# Patient Record
Sex: FEMALE | Race: WHITE
[De-identification: names, ages, dates, MRNs, and addresses within clinical notes are randomized per-mention and may not be internally consistent; named-entity substitution may affect disease eponyms.]

---

## 2023-07-24 PROBLEM — Z00.00 ENCOUNTER FOR PREVENTIVE HEALTH EXAMINATION: Status: ACTIVE | Noted: 2023-07-24

## 2023-08-08 ENCOUNTER — APPOINTMENT (OUTPATIENT)
Dept: NEUROLOGY | Facility: CLINIC | Age: 28
End: 2023-08-08
Payer: COMMERCIAL

## 2023-08-08 VITALS
SYSTOLIC BLOOD PRESSURE: 110 MMHG | HEART RATE: 93 BPM | DIASTOLIC BLOOD PRESSURE: 77 MMHG | BODY MASS INDEX: 37.56 KG/M2 | OXYGEN SATURATION: 97 % | HEIGHT: 64 IN | WEIGHT: 220 LBS

## 2023-08-08 DIAGNOSIS — Z78.9 OTHER SPECIFIED HEALTH STATUS: ICD-10-CM

## 2023-08-08 DIAGNOSIS — R20.0 ANESTHESIA OF SKIN: ICD-10-CM

## 2023-08-08 DIAGNOSIS — G89.29 DORSALGIA, UNSPECIFIED: ICD-10-CM

## 2023-08-08 DIAGNOSIS — M54.9 DORSALGIA, UNSPECIFIED: ICD-10-CM

## 2023-08-08 DIAGNOSIS — S06.0X9S CONCUSSION WITH LOSS OF CONSCIOUSNESS OF UNSPECIFIED DURATION, SEQUELA: ICD-10-CM

## 2023-08-08 PROCEDURE — 99204 OFFICE O/P NEW MOD 45 MIN: CPT

## 2023-08-08 RX ORDER — NORETHINDRONE ACETATE AND ETHINYL ESTRADIOL AND FERROUS FUMARATE 1MG-20(21)
1-20 KIT ORAL
Refills: 0 | Status: ACTIVE | COMMUNITY
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
## 2023-08-08 NOTE — ASSESSMENT
[FreeTextEntry1] : 28 year old female presents today with complaints of right thigh numbness for the past 2 months. has chronic LBP  on exam RA thigh with decreased sensation to pp and light touch. improved some. EMG discussed to r/o meralgia , paresthecia declined. for her LBP, PT, LS MRI  avoid putting phone or wallet in side of pockets.  further evaluation based upon above findings

## 2023-08-08 NOTE — PHYSICAL EXAM
[Person] : oriented to person [Place] : oriented to place [Time] : oriented to time [Fluency] : fluency intact [Past History] : adequate knowledge of personal past history [Cranial Nerves Optic (II)] : visual acuity intact bilaterally,  visual fields full to confrontation, pupils equal round and reactive to light [Cranial Nerves Oculomotor (III)] : extraocular motion intact [Cranial Nerves Trigeminal (V)] : facial sensation intact symmetrically [Cranial Nerves Facial (VII)] : face symmetrical [Cranial Nerves Glossopharyngeal (IX)] : tongue and palate midline [Cranial Nerves Vestibulocochlear (VIII)] : hearing was intact bilaterally [Cranial Nerves Accessory (XI - Cranial And Spinal)] : head turning and shoulder shrug symmetric [Cranial Nerves Hypoglossal (XII)] : there was no tongue deviation with protrusion [Motor Tone] : muscle tone was normal in all four extremities [Motor Strength] : muscle strength was normal in all four extremities [No Muscle Atrophy] : normal bulk in all four extremities [Abnormal Walk] : normal gait [Balance] : balance was intact [2+] : Ankle jerk left 2+ [General Appearance - In No Acute Distress] : in no acute distress [Affect] : the affect was normal [Mood] : the mood was normal [Short Term Intact] : short term memory intact [Remote Intact] : remote memory intact [Registration Intact] : recent registration memory intact [Current Events] : adequate knowledge of current events [Motor Handedness Right-Handed] : the patient is right hand dominant [Proprioception] : proprioception was intact [Tactile Decrease Upper Thigh (L2) - Right Only] : diminished right upper thigh (L2) [Tactile Decrease Upper Thigh (L2) - Left Only] : normal left upper thigh (L2) [Tactile Decrease Lower Thigh (L3) - Right Only] : diminished right lower thigh (L3) [Tactile Decrease Lower Thigh (L3) - Left Only] : normal left lower thigh (L3) [Pain / Temp Decrease Lower Thigh (L3) - Right Only] : diminished right lower thigh (L3) [Pain / Temp Decrease Lower Thigh (L3) - Left Only] : normal left lower thigh (L3) [Pain / Temp Decrease Upper Thigh (L2) - Right Only] : abnormal right anterior thigh [Pain / Temp Decrease Upper Thigh (L2) - Left Only] : abnormal left anterior thigh [Past-pointing] : there was no past-pointing [Tremor] : no tremor present [Plantar Reflex Right Only] : normal on the right [Plantar Reflex Left Only] : normal on the left [No Spinal Tenderness] : no spinal tenderness [FreeTextEntry1] : paraspinal LS